# Patient Record
(demographics unavailable — no encounter records)

---

## 2024-11-11 NOTE — HISTORY OF PRESENT ILLNESS
[de-identified] : 64 year old male with history of diabetes, hyperlipidemia, DANNY, Obesity presents for annual exam.    Weight has plateaued somewhat and 2 mg of Ozempic. .  Still walking daily Last A1c-5.9 h/o DANNY interested in Inspire, Was denied at that time due to weight currently much improved.   BGM-does not check Ophthalmologist -was seen 8/2023, no diabetic retinopathy.  having vison issues, needs followup appt    retired  non-smoker

## 2024-11-11 NOTE — PHYSICAL EXAM
[Normal] : normal rate, regular rhythm, normal S1 and S2 and no murmur heard [No Edema] : there was no peripheral edema [Soft] : abdomen soft [Non Tender] : non-tender [Non-distended] : non-distended [Normal Posterior Cervical Nodes] : no posterior cervical lymphadenopathy [Normal Anterior Cervical Nodes] : no anterior cervical lymphadenopathy [No CVA Tenderness] : no CVA  tenderness [No Joint Swelling] : no joint swelling [No Rash] : no rash [No Focal Deficits] : no focal deficits [Normal Affect] : the affect was normal [Normal Mood] : the mood was normal

## 2024-11-11 NOTE — HEALTH RISK ASSESSMENT
[Good] : ~his/her~  mood as  good [Never] : Never [Change in mental status noted] : No change in mental status noted [ColonoscopyDate] : 2021

## 2024-11-11 NOTE — HISTORY OF PRESENT ILLNESS
[de-identified] : 64 year old male with history of diabetes, hyperlipidemia, DANNY, Obesity presents for annual exam.    Weight has plateaued somewhat and 2 mg of Ozempic. .  Still walking daily Last A1c-5.9 h/o DANNY interested in Inspire, Was denied at that time due to weight currently much improved.   BGM-does not check Ophthalmologist -was seen 8/2023, no diabetic retinopathy.  having vison issues, needs followup appt    retired  non-smoker

## 2024-11-11 NOTE — ASSESSMENT
[FreeTextEntry1] : //  borderline HTN, better with weight loss -better with weight loss -Continue healthy diet and exercise  DM, controlled  -cont metformin once daily -stop ozempic 2mg qweekly, start mounjaro as directed.   -Advised with glucometer and to check when not feeling well -cont risks and benefits of medication discussed in detail -will need opthalmology checkup, has appt upcoming  Hyperlipidemia, improved -cont statin -Will check labs  DANNY uses CPAP at night with good results. interested in Inspire -f/u with sleep specialist for repeat study, info given  -focus on weight loss   DM, HLD, Obese -stop ozempic 2mg qweekly, start mounjaro as directed.   -Being overweight increases your risk of health conditions such as heart disease, high blood pressure, type 2 diabetes, and certain types of cancer. It can also increase your risk for osteoarthritis, sleep apnea, and other respiratory problems. Aim for a slow, steady weight loss. Even a small amount of weight loss can lower your risk of health problems.  Healthcare Maintenance -Advise Yearly Skin cancer screening with Dermatologist  -Advise Yearly Eye exam with Ophthalmologist -Advise Yearly Dental exam -Educated of the importance of Healthy diet, such as Mediterranean Diet and Exercise, such as walking >20 minutes a day and increasing gradually as tolerated  Immunizations -Flu vaccine -declined  -Covid vaccine   Preventative screening  -advised to get colonoscopy for colon cancer screening -up to date  -will check PSA for prostate cancer screening -labs drawn   message in 3 weeks for dose increase, f/u oin 2 months for in office appt

## 2025-01-13 NOTE — HISTORY OF PRESENT ILLNESS
[de-identified] : 64 year old male with history of diabetes, hyperlipidemia, DANNY, Obesity presents for follow-up.    issues with getting mounjaro, still on ozempic with some weight gain?  Last A1c-5.9 h/o DANNY interested in Inspire, Was denied at that time due to weight currently much improved.   BGM-does not check Ophthalmologist -was seen 8/2023, no diabetic retinopathy.  having vison issues, needs follow-up appt    retired  non-smoker

## 2025-01-13 NOTE — HISTORY OF PRESENT ILLNESS
[de-identified] : 64 year old male with history of diabetes, hyperlipidemia, DANNY, Obesity presents for follow-up.    issues with getting mounjaro, still on ozempic with some weight gain?  Last A1c-5.9 h/o DANNY interested in Inspire, Was denied at that time due to weight currently much improved.   BGM-does not check Ophthalmologist -was seen 8/2023, no diabetic retinopathy.  having vison issues, needs follow-up appt    retired  non-smoker

## 2025-01-13 NOTE — ASSESSMENT
[FreeTextEntry1] : //  borderline HTN, better with weight loss -better with weight loss -Continue healthy diet and exercise  DM, controlled  -cont metformin once daily -stop ozempic 2mg qweekly, start mounjaro as directed.   -Advised with glucometer and to check when not feeling well -cont risks and benefits of medication discussed in detail -will need ophthalmology checkup,  Hyperlipidemia, improved -cont statin -Will check labs  DANNY uses CPAP at night with good results. interested in Inspire -f/u with sleep specialist for repeat study, info given  -focus on weight loss   DM, HLD, Obese -stop ozempic 2mg qweekly, start mounjaro as directed.   -Being overweight increases your risk of health conditions such as heart disease, high blood pressure, type 2 diabetes, and certain types of cancer. It can also increase your risk for osteoarthritis, sleep apnea, and other respiratory problems. Aim for a slow, steady weight loss. Even a small amount of weight loss can lower your risk of health problems.   f/u in 2 months

## 2025-04-01 NOTE — ASSESSMENT
[FreeTextEntry1] : //  Diarrhea, will treat for possible diverticulitis  -augmentin as directed  -Advised to drink Pedialyte, Gatorade, and/or Ginger ale as its better tolerated when the stomach has been irritated as opposed to plain water -Nature of disease and action of medications reviewed with patient  -Advised if symptoms worsen, develop rigid abdomen, localized tenderness, severe persistent vomiting, fever or lethargy to go directly to ED for further evaluation.  borderline HTN, better with weight loss -better with weight loss -Continue healthy diet and exercise  DM, controlled  -cont metformin once daily -stop ozempic 2mg qweekly, start mounjaro as directed.   -Advised with glucometer and to check when not feeling well -cont risks and benefits of medication discussed in detail -will need ophthalmology checkup,  Hyperlipidemia, improved -cont statin -Will check labs  DANNY uses CPAP at night with good results. interested in Inspire -f/u with sleep specialist for repeat study, info given  -focus on weight loss   DM, HLD, Obese -stop ozempic 2mg qweekly, start mounjaro as directed.   -Being overweight increases your risk of health conditions such as heart disease, high blood pressure, type 2 diabetes, and certain types of cancer. It can also increase your risk for osteoarthritis, sleep apnea, and other respiratory problems. Aim for a slow, steady weight loss. Even a small amount of weight loss can lower your risk of health problems.   f/u in 2 months, sooner if needed

## 2025-04-01 NOTE — HISTORY OF PRESENT ILLNESS
[de-identified] : 64 year old male with history of diabetes, hyperlipidemia, DANNY, Obesity presents for follow-up.    diarrhea, 2-3x BM, 2 weeks ago. some chills. abdominal cramping.  no antibiotics or recent travel recently.  no nausea/vomiting.    Still having issues getting Mounjaro.  Still on Ozempic.  Last A1c-5.9 h/o DANNY interested in Inspire, Was denied at that time due to weight currently much improved.  needs to betty BGM-does not check Ophthalmologist -was seen 8/2023    retired  non-smoker

## 2025-04-01 NOTE — PHYSICAL EXAM
[Normal] : normal rate, regular rhythm, normal S1 and S2 and no murmur heard [No Edema] : there was no peripheral edema [Soft] : abdomen soft [Non-distended] : non-distended [No Focal Deficits] : no focal deficits [Normal Affect] : the affect was normal [Normal Mood] : the mood was normal [de-identified] : Slight tenderness in lower quadrant bilaterally

## 2025-04-01 NOTE — PHYSICAL EXAM
[Normal] : normal rate, regular rhythm, normal S1 and S2 and no murmur heard [No Edema] : there was no peripheral edema [Soft] : abdomen soft [Non-distended] : non-distended [No Focal Deficits] : no focal deficits [Normal Affect] : the affect was normal [Normal Mood] : the mood was normal [de-identified] : Slight tenderness in lower quadrant bilaterally

## 2025-04-01 NOTE — HISTORY OF PRESENT ILLNESS
[de-identified] : 64 year old male with history of diabetes, hyperlipidemia, DANNY, Obesity presents for follow-up.    diarrhea, 2-3x BM, 2 weeks ago. some chills. abdominal cramping.  no antibiotics or recent travel recently.  no nausea/vomiting.    Still having issues getting Mounjaro.  Still on Ozempic.  Last A1c-5.9 h/o DANNY interested in Inspire, Was denied at that time due to weight currently much improved.  needs to betty BGM-does not check Ophthalmologist -was seen 8/2023    retired  non-smoker

## 2025-04-21 NOTE — ASSESSMENT
[FreeTextEntry1] : //  Diarrhea, will treat for possible diverticulitis -resolved after augmentin  -monitor   borderline HTN, better with weight loss -better with weight loss -Continue healthy diet and exercise  DM, controlled  -cont metformin once daily -increase mounjaro to 10mg as directed.   -Advised with glucometer and to check when not feeling well -cont risks and benefits of medication discussed in detail -will need ophthalmology checkup,  Hyperlipidemia, improved -cont statin -Will check labs  DANNY uses CPAP at night with good results. interested in Inspire -f/u with sleep specialist for repeat study, info given  -focus on weight loss   DM, HLD, Obese -increase mounjaro to 10mg as directed.  -Being overweight increases your risk of health conditions such as heart disease, high blood pressure, type 2 diabetes, and certain types of cancer. It can also increase your risk for osteoarthritis, sleep apnea, and other respiratory problems. Aim for a slow, steady weight loss. Even a small amount of weight loss can lower your risk of health problems.   f/u in 2 months, sooner if needed

## 2025-04-21 NOTE — HISTORY OF PRESENT ILLNESS
[de-identified] : 64 year old male with history of diabetes, hyperlipidemia, DANNY, Obesity presents for follow-up.    diarrhea,-resolved doing well on mounjaro, some constipation, better after increasing fluids   Still having issues getting Mounjaro.  Still on Ozempic.  h/o DANNY interested in Inspire, Was denied at that time due to weight currently much improved.  needs to betty BGM-does not check Ophthalmologist -was seen 8/2023    retired  non-smoker

## 2025-06-23 NOTE — ASSESSMENT
[FreeTextEntry1] : //  Precious malone in 2020 -due for repeat colonoscopy   Borderline HTN, better with weight loss -better with weight loss -Continue healthy diet and exercise  DM, controlled  -continue metformin once daily -continue mounjaro to 10mg as directed.   -Advised with glucometer and to check when not feeling well -cont risks and benefits of medication discussed in detail -will need ophthalmology checkup,  Hyperlipidemia, improved -cont statin -Will check labs  DANNY uses CPAP at night with good results. interested in Inspire -f/u with sleep specialist for repeat study, info given  -focus on weight loss   DM, HLD, Obese -continue mounjaro 10mg as directed.  -Being overweight increases your risk of health conditions such as heart disease, high blood pressure, type 2 diabetes, and certain types of cancer. It can also increase your risk for osteoarthritis, sleep apnea, and other respiratory problems. Aim for a slow, steady weight loss. Even a small amount of weight loss can lower your risk of health problems.   f/u in 3 months

## 2025-06-23 NOTE — HISTORY OF PRESENT ILLNESS
[de-identified] : 64 year old male with history of diabetes, hyperlipidemia, DANNY, Obesity presents for follow-up.    doing well on mounjaro, some constipation, better after increasing fluids  busy doing renovations around the house   h/o DANNY interested in Inspire, Was denied at that time due to weight currently much improved.  needs to betty BGM-does not check Ophthalmologist -was seen 8/2023    retired  non-smoker